# Patient Record
(demographics unavailable — no encounter records)

---

## 2025-06-10 NOTE — HISTORY OF PRESENT ILLNESS
[Y] : Patient is sexually active [Currently Active] : currently active [Men] : men [Yes] : Yes [Patient refuses STI testing] : Patient refuses STI testing [Patient reported PAP Smear was normal] : Patient reported PAP Smear was normal [FreeTextEntry1] : presents for a well women visit and gyn exam.  [LMPDate] : 6/7/25 [PapSmeardate] : 2024 [MensesLength] : 7-8 [MensesFreq] : 28-30 [No] : never pregnant [TextBox_6] : jeyson [FreeTextEntry2] : kyleena [TextBox_9] : 12

## 2025-06-10 NOTE — PLAN
[FreeTextEntry1] : annual: pap and gc/ct  kyleena placed in September ok but will want meds next time still having some prolonged spotting declines blood work did get hpv vaccine

## 2025-06-10 NOTE — PHYSICAL EXAM
[MA] : MA [Appropriately responsive] : appropriately responsive [Alert] : alert [No Acute Distress] : no acute distress [No Lymphadenopathy] : no lymphadenopathy [Regular Rate Rhythm] : regular rate rhythm [No Murmurs] : no murmurs [Clear to Auscultation B/L] : clear to auscultation bilaterally [Soft] : soft [Non-tender] : non-tender [Non-distended] : non-distended [No HSM] : No HSM [No Lesions] : no lesions [No Mass] : no mass [Oriented x3] : oriented x3 [Examination Of The Breasts] : a normal appearance [No Masses] : no breast masses were palpable [Labia Majora] : normal [Labia Minora] : normal [IUD String] : an IUD string was noted [Uterine Adnexae] : normal [Normal] : normal